# Patient Record
Sex: MALE | URBAN - METROPOLITAN AREA
[De-identification: names, ages, dates, MRNs, and addresses within clinical notes are randomized per-mention and may not be internally consistent; named-entity substitution may affect disease eponyms.]

---

## 2017-01-11 ENCOUNTER — TRANSFERRED RECORDS (OUTPATIENT)
Dept: HEALTH INFORMATION MANAGEMENT | Facility: CLINIC | Age: 37
End: 2017-01-11

## 2017-01-19 ENCOUNTER — TRANSFERRED RECORDS (OUTPATIENT)
Dept: HEALTH INFORMATION MANAGEMENT | Facility: CLINIC | Age: 37
End: 2017-01-19

## 2017-01-23 RX ORDER — HYDROCODONE BITARTRATE AND ACETAMINOPHEN 5; 325 MG/1; MG/1
2 TABLET ORAL EVERY 6 HOURS PRN
Status: ON HOLD | COMMUNITY
End: 2017-01-25

## 2017-01-24 RX ORDER — CEFAZOLIN SODIUM 1 G/3ML
1 INJECTION, POWDER, FOR SOLUTION INTRAMUSCULAR; INTRAVENOUS SEE ADMIN INSTRUCTIONS
Status: CANCELLED | OUTPATIENT
Start: 2017-01-24

## 2017-01-24 RX ORDER — CEFAZOLIN SODIUM 2 G/100ML
2 INJECTION, SOLUTION INTRAVENOUS
Status: CANCELLED | OUTPATIENT
Start: 2017-01-24

## 2017-01-25 ENCOUNTER — HOSPITAL ENCOUNTER (OUTPATIENT)
Facility: CLINIC | Age: 37
LOS: 1 days | Discharge: HOME | End: 2017-01-25
Attending: ORTHOPAEDIC SURGERY | Admitting: ORTHOPAEDIC SURGERY
Payer: COMMERCIAL

## 2017-01-25 ENCOUNTER — APPOINTMENT (OUTPATIENT)
Dept: GENERAL RADIOLOGY | Facility: CLINIC | Age: 37
End: 2017-01-25
Attending: ORTHOPAEDIC SURGERY
Payer: COMMERCIAL

## 2017-01-25 ENCOUNTER — ANESTHESIA (OUTPATIENT)
Dept: SURGERY | Facility: CLINIC | Age: 37
End: 2017-01-25
Payer: COMMERCIAL

## 2017-01-25 ENCOUNTER — ANESTHESIA EVENT (OUTPATIENT)
Dept: SURGERY | Facility: CLINIC | Age: 37
End: 2017-01-25
Payer: COMMERCIAL

## 2017-01-25 VITALS
HEIGHT: 72 IN | TEMPERATURE: 99 F | SYSTOLIC BLOOD PRESSURE: 129 MMHG | RESPIRATION RATE: 18 BRPM | DIASTOLIC BLOOD PRESSURE: 91 MMHG | BODY MASS INDEX: 31.97 KG/M2 | WEIGHT: 236 LBS | OXYGEN SATURATION: 95 %

## 2017-01-25 DIAGNOSIS — Z98.890 S/P DISCECTOMY: Primary | ICD-10-CM

## 2017-01-25 PROBLEM — M51.26 LUMBAR DISC HERNIATION: Status: ACTIVE | Noted: 2017-01-25

## 2017-01-25 PROCEDURE — 25000125 ZZHC RX 250: Performed by: NURSE ANESTHETIST, CERTIFIED REGISTERED

## 2017-01-25 PROCEDURE — 25000125 ZZHC RX 250: Performed by: PHYSICIAN ASSISTANT

## 2017-01-25 PROCEDURE — 27210794 ZZH OR GENERAL SUPPLY STERILE: Performed by: ORTHOPAEDIC SURGERY

## 2017-01-25 PROCEDURE — 25800025 ZZH RX 258: Performed by: SURGERY

## 2017-01-25 PROCEDURE — 25000566 ZZH SEVOFLURANE, EA 15 MIN: Performed by: ORTHOPAEDIC SURGERY

## 2017-01-25 PROCEDURE — 37000008 ZZH ANESTHESIA TECHNICAL FEE, 1ST 30 MIN: Performed by: ORTHOPAEDIC SURGERY

## 2017-01-25 PROCEDURE — 71000012 ZZH RECOVERY PHASE 1 LEVEL 1 FIRST HR: Performed by: ORTHOPAEDIC SURGERY

## 2017-01-25 PROCEDURE — 37000009 ZZH ANESTHESIA TECHNICAL FEE, EACH ADDTL 15 MIN: Performed by: ORTHOPAEDIC SURGERY

## 2017-01-25 PROCEDURE — 25000128 H RX IP 250 OP 636: Performed by: NURSE ANESTHETIST, CERTIFIED REGISTERED

## 2017-01-25 PROCEDURE — 25000125 ZZHC RX 250: Performed by: ORTHOPAEDIC SURGERY

## 2017-01-25 PROCEDURE — 40000277 XR SURGERY CARM FLUORO LESS THAN 5 MIN W STILLS: Mod: TC

## 2017-01-25 PROCEDURE — 25000125 ZZHC RX 250: Performed by: ANESTHESIOLOGY

## 2017-01-25 PROCEDURE — 36000065 ZZH SURGERY LEVEL 4 W FLUORO 1ST 30 MIN: Performed by: ORTHOPAEDIC SURGERY

## 2017-01-25 PROCEDURE — 25000132 ZZH RX MED GY IP 250 OP 250 PS 637: Performed by: PHYSICIAN ASSISTANT

## 2017-01-25 PROCEDURE — 36000063 ZZH SURGERY LEVEL 4 EA 15 ADDTL MIN: Performed by: ORTHOPAEDIC SURGERY

## 2017-01-25 PROCEDURE — 25000128 H RX IP 250 OP 636: Performed by: PHYSICIAN ASSISTANT

## 2017-01-25 PROCEDURE — 25000125 ZZHC RX 250: Performed by: SURGERY

## 2017-01-25 PROCEDURE — 40000170 ZZH STATISTIC PRE-PROCEDURE ASSESSMENT II: Performed by: ORTHOPAEDIC SURGERY

## 2017-01-25 RX ORDER — CEFAZOLIN SODIUM 1 G/3ML
1 INJECTION, POWDER, FOR SOLUTION INTRAMUSCULAR; INTRAVENOUS EVERY 8 HOURS
Status: DISCONTINUED | OUTPATIENT
Start: 2017-01-25 | End: 2017-01-25 | Stop reason: HOSPADM

## 2017-01-25 RX ORDER — ONDANSETRON 4 MG/1
4 TABLET, ORALLY DISINTEGRATING ORAL EVERY 30 MIN PRN
Status: DISCONTINUED | OUTPATIENT
Start: 2017-01-25 | End: 2017-01-25 | Stop reason: HOSPADM

## 2017-01-25 RX ORDER — ONDANSETRON 2 MG/ML
4 INJECTION INTRAMUSCULAR; INTRAVENOUS EVERY 6 HOURS PRN
Status: DISCONTINUED | OUTPATIENT
Start: 2017-01-25 | End: 2017-01-25 | Stop reason: HOSPADM

## 2017-01-25 RX ORDER — CEFAZOLIN SODIUM 1 G/3ML
1 INJECTION, POWDER, FOR SOLUTION INTRAMUSCULAR; INTRAVENOUS SEE ADMIN INSTRUCTIONS
Status: DISCONTINUED | OUTPATIENT
Start: 2017-01-25 | End: 2017-01-25 | Stop reason: HOSPADM

## 2017-01-25 RX ORDER — OXYCODONE HYDROCHLORIDE 5 MG/1
5-10 TABLET ORAL
Status: DISCONTINUED | OUTPATIENT
Start: 2017-01-25 | End: 2017-01-25 | Stop reason: HOSPADM

## 2017-01-25 RX ORDER — PROPOFOL 10 MG/ML
INJECTION, EMULSION INTRAVENOUS PRN
Status: DISCONTINUED | OUTPATIENT
Start: 2017-01-25 | End: 2017-01-25

## 2017-01-25 RX ORDER — CEFAZOLIN SODIUM 2 G/100ML
2 INJECTION, SOLUTION INTRAVENOUS
Status: COMPLETED | OUTPATIENT
Start: 2017-01-25 | End: 2017-01-25

## 2017-01-25 RX ORDER — DIPHENHYDRAMINE HYDROCHLORIDE 50 MG/ML
INJECTION INTRAMUSCULAR; INTRAVENOUS PRN
Status: DISCONTINUED | OUTPATIENT
Start: 2017-01-25 | End: 2017-01-25

## 2017-01-25 RX ORDER — SODIUM CHLORIDE, SODIUM LACTATE, POTASSIUM CHLORIDE, CALCIUM CHLORIDE 600; 310; 30; 20 MG/100ML; MG/100ML; MG/100ML; MG/100ML
1000 INJECTION, SOLUTION INTRAVENOUS CONTINUOUS
Status: DISCONTINUED | OUTPATIENT
Start: 2017-01-25 | End: 2017-01-25 | Stop reason: HOSPADM

## 2017-01-25 RX ORDER — ALBUTEROL SULFATE 0.83 MG/ML
2.5 SOLUTION RESPIRATORY (INHALATION) EVERY 4 HOURS PRN
Status: DISCONTINUED | OUTPATIENT
Start: 2017-01-25 | End: 2017-01-25 | Stop reason: HOSPADM

## 2017-01-25 RX ORDER — HYDROMORPHONE HYDROCHLORIDE 1 MG/ML
.3-.5 INJECTION, SOLUTION INTRAMUSCULAR; INTRAVENOUS; SUBCUTANEOUS EVERY 5 MIN PRN
Status: DISCONTINUED | OUTPATIENT
Start: 2017-01-25 | End: 2017-01-25 | Stop reason: HOSPADM

## 2017-01-25 RX ORDER — FENTANYL CITRATE 50 UG/ML
25-50 INJECTION, SOLUTION INTRAMUSCULAR; INTRAVENOUS
Status: DISCONTINUED | OUTPATIENT
Start: 2017-01-25 | End: 2017-01-25 | Stop reason: HOSPADM

## 2017-01-25 RX ORDER — PROCHLORPERAZINE MALEATE 5 MG
5-10 TABLET ORAL EVERY 6 HOURS PRN
Status: DISCONTINUED | OUTPATIENT
Start: 2017-01-25 | End: 2017-01-25 | Stop reason: HOSPADM

## 2017-01-25 RX ORDER — ONDANSETRON 4 MG/1
4 TABLET, ORALLY DISINTEGRATING ORAL EVERY 6 HOURS PRN
Status: DISCONTINUED | OUTPATIENT
Start: 2017-01-25 | End: 2017-01-25 | Stop reason: HOSPADM

## 2017-01-25 RX ORDER — DEXAMETHASONE SODIUM PHOSPHATE 4 MG/ML
INJECTION, SOLUTION INTRA-ARTICULAR; INTRALESIONAL; INTRAMUSCULAR; INTRAVENOUS; SOFT TISSUE PRN
Status: DISCONTINUED | OUTPATIENT
Start: 2017-01-25 | End: 2017-01-25

## 2017-01-25 RX ORDER — BETAMETHASONE SODIUM PHOSPHATE AND BETAMETHASONE ACETATE 3; 3 MG/ML; MG/ML
INJECTION, SUSPENSION INTRA-ARTICULAR; INTRALESIONAL; INTRAMUSCULAR; SOFT TISSUE PRN
Status: DISCONTINUED | OUTPATIENT
Start: 2017-01-25 | End: 2017-01-25 | Stop reason: HOSPADM

## 2017-01-25 RX ORDER — ESMOLOL HYDROCHLORIDE 10 MG/ML
INJECTION INTRAVENOUS PRN
Status: DISCONTINUED | OUTPATIENT
Start: 2017-01-25 | End: 2017-01-25

## 2017-01-25 RX ORDER — SODIUM CHLORIDE 9 MG/ML
1000 INJECTION, SOLUTION INTRAVENOUS CONTINUOUS
Status: DISCONTINUED | OUTPATIENT
Start: 2017-01-25 | End: 2017-01-25 | Stop reason: HOSPADM

## 2017-01-25 RX ORDER — ONDANSETRON 2 MG/ML
4 INJECTION INTRAMUSCULAR; INTRAVENOUS EVERY 30 MIN PRN
Status: DISCONTINUED | OUTPATIENT
Start: 2017-01-25 | End: 2017-01-25 | Stop reason: HOSPADM

## 2017-01-25 RX ORDER — NEOSTIGMINE METHYLSULFATE 1 MG/ML
VIAL (ML) INJECTION PRN
Status: DISCONTINUED | OUTPATIENT
Start: 2017-01-25 | End: 2017-01-25

## 2017-01-25 RX ORDER — SODIUM CHLORIDE, SODIUM LACTATE, POTASSIUM CHLORIDE, CALCIUM CHLORIDE 600; 310; 30; 20 MG/100ML; MG/100ML; MG/100ML; MG/100ML
INJECTION, SOLUTION INTRAVENOUS CONTINUOUS
Status: DISCONTINUED | OUTPATIENT
Start: 2017-01-25 | End: 2017-01-25 | Stop reason: HOSPADM

## 2017-01-25 RX ORDER — LIDOCAINE HYDROCHLORIDE 20 MG/ML
INJECTION, SOLUTION INFILTRATION; PERINEURAL PRN
Status: DISCONTINUED | OUTPATIENT
Start: 2017-01-25 | End: 2017-01-25

## 2017-01-25 RX ORDER — OXYCODONE HYDROCHLORIDE 5 MG/1
5-10 TABLET ORAL
Qty: 30 TABLET | Refills: 0 | Status: SHIPPED | OUTPATIENT
Start: 2017-01-25

## 2017-01-25 RX ORDER — NALOXONE HYDROCHLORIDE 0.4 MG/ML
.1-.4 INJECTION, SOLUTION INTRAMUSCULAR; INTRAVENOUS; SUBCUTANEOUS
Status: DISCONTINUED | OUTPATIENT
Start: 2017-01-25 | End: 2017-01-25 | Stop reason: HOSPADM

## 2017-01-25 RX ORDER — CEFAZOLIN SODIUM 2 G/100ML
2 INJECTION, SOLUTION INTRAVENOUS
Status: DISCONTINUED | OUTPATIENT
Start: 2017-01-25 | End: 2017-01-25 | Stop reason: HOSPADM

## 2017-01-25 RX ORDER — HYDROMORPHONE HYDROCHLORIDE 1 MG/ML
.3-.5 INJECTION, SOLUTION INTRAMUSCULAR; INTRAVENOUS; SUBCUTANEOUS EVERY 30 MIN PRN
Status: DISCONTINUED | OUTPATIENT
Start: 2017-01-25 | End: 2017-01-25 | Stop reason: HOSPADM

## 2017-01-25 RX ORDER — GLYCOPYRROLATE 0.2 MG/ML
INJECTION, SOLUTION INTRAMUSCULAR; INTRAVENOUS PRN
Status: DISCONTINUED | OUTPATIENT
Start: 2017-01-25 | End: 2017-01-25

## 2017-01-25 RX ORDER — ONDANSETRON 2 MG/ML
INJECTION INTRAMUSCULAR; INTRAVENOUS PRN
Status: DISCONTINUED | OUTPATIENT
Start: 2017-01-25 | End: 2017-01-25

## 2017-01-25 RX ORDER — MEPERIDINE HYDROCHLORIDE 25 MG/ML
12.5 INJECTION INTRAMUSCULAR; INTRAVENOUS; SUBCUTANEOUS EVERY 5 MIN PRN
Status: DISCONTINUED | OUTPATIENT
Start: 2017-01-25 | End: 2017-01-25 | Stop reason: HOSPADM

## 2017-01-25 RX ORDER — FENTANYL CITRATE 50 UG/ML
INJECTION, SOLUTION INTRAMUSCULAR; INTRAVENOUS PRN
Status: DISCONTINUED | OUTPATIENT
Start: 2017-01-25 | End: 2017-01-25

## 2017-01-25 RX ADMIN — SODIUM CHLORIDE, POTASSIUM CHLORIDE, SODIUM LACTATE AND CALCIUM CHLORIDE: 600; 310; 30; 20 INJECTION, SOLUTION INTRAVENOUS at 13:18

## 2017-01-25 RX ADMIN — OXYCODONE HYDROCHLORIDE 5 MG: 5 TABLET ORAL at 19:46

## 2017-01-25 RX ADMIN — LIDOCAINE HYDROCHLORIDE 0.5 ML: 10 INJECTION, SOLUTION INFILTRATION; PERINEURAL at 10:45

## 2017-01-25 RX ADMIN — PROPOFOL 50 MG: 10 INJECTION, EMULSION INTRAVENOUS at 12:53

## 2017-01-25 RX ADMIN — ONDANSETRON 4 MG: 2 INJECTION INTRAMUSCULAR; INTRAVENOUS at 13:43

## 2017-01-25 RX ADMIN — ROCURONIUM BROMIDE 50 MG: 10 INJECTION INTRAVENOUS at 12:20

## 2017-01-25 RX ADMIN — HYDROMORPHONE HYDROCHLORIDE 0.5 MG: 1 INJECTION, SOLUTION INTRAMUSCULAR; INTRAVENOUS; SUBCUTANEOUS at 14:39

## 2017-01-25 RX ADMIN — LIDOCAINE HYDROCHLORIDE 40 MG: 20 INJECTION, SOLUTION INFILTRATION; PERINEURAL at 12:27

## 2017-01-25 RX ADMIN — PHENYLEPHRINE HYDROCHLORIDE 100 MCG: 10 INJECTION, SOLUTION INTRAMUSCULAR; INTRAVENOUS; SUBCUTANEOUS at 13:05

## 2017-01-25 RX ADMIN — FENTANYL CITRATE 50 MCG: 50 INJECTION, SOLUTION INTRAMUSCULAR; INTRAVENOUS at 14:51

## 2017-01-25 RX ADMIN — ROCURONIUM BROMIDE 10 MG: 10 INJECTION INTRAVENOUS at 13:08

## 2017-01-25 RX ADMIN — SODIUM CHLORIDE, POTASSIUM CHLORIDE, SODIUM LACTATE AND CALCIUM CHLORIDE: 600; 310; 30; 20 INJECTION, SOLUTION INTRAVENOUS at 10:45

## 2017-01-25 RX ADMIN — ROCURONIUM BROMIDE 10 MG: 10 INJECTION INTRAVENOUS at 13:21

## 2017-01-25 RX ADMIN — CEFAZOLIN SODIUM 2 G: 2 INJECTION, SOLUTION INTRAVENOUS at 12:50

## 2017-01-25 RX ADMIN — NEOSTIGMINE METHYLSULFATE 5 MG: 1 INJECTION INTRAMUSCULAR; INTRAVENOUS; SUBCUTANEOUS at 13:47

## 2017-01-25 RX ADMIN — PHENYLEPHRINE HYDROCHLORIDE 100 MCG: 10 INJECTION, SOLUTION INTRAMUSCULAR; INTRAVENOUS; SUBCUTANEOUS at 13:11

## 2017-01-25 RX ADMIN — GLYCOPYRROLATE 1 MG: 0.2 INJECTION, SOLUTION INTRAMUSCULAR; INTRAVENOUS at 13:47

## 2017-01-25 RX ADMIN — PHENYLEPHRINE HYDROCHLORIDE 100 MCG: 10 INJECTION, SOLUTION INTRAMUSCULAR; INTRAVENOUS; SUBCUTANEOUS at 13:39

## 2017-01-25 RX ADMIN — FENTANYL CITRATE 100 MCG: 50 INJECTION, SOLUTION INTRAMUSCULAR; INTRAVENOUS at 12:25

## 2017-01-25 RX ADMIN — FENTANYL CITRATE 50 MCG: 50 INJECTION, SOLUTION INTRAMUSCULAR; INTRAVENOUS at 13:26

## 2017-01-25 RX ADMIN — MIDAZOLAM HYDROCHLORIDE 2 MG: 1 INJECTION, SOLUTION INTRAMUSCULAR; INTRAVENOUS at 12:20

## 2017-01-25 RX ADMIN — PHENYLEPHRINE HYDROCHLORIDE 100 MCG: 10 INJECTION, SOLUTION INTRAMUSCULAR; INTRAVENOUS; SUBCUTANEOUS at 13:01

## 2017-01-25 RX ADMIN — DIPHENHYDRAMINE HYDROCHLORIDE 12.5 MG: 50 INJECTION, SOLUTION INTRAMUSCULAR; INTRAVENOUS at 12:44

## 2017-01-25 RX ADMIN — ESMOLOL HYDROCHLORIDE 10 MG: 10 INJECTION, SOLUTION INTRAVENOUS at 12:56

## 2017-01-25 RX ADMIN — DEXMEDETOMIDINE 12 MCG: 100 INJECTION, SOLUTION, CONCENTRATE INTRAVENOUS at 12:44

## 2017-01-25 RX ADMIN — FENTANYL CITRATE 50 MCG: 50 INJECTION, SOLUTION INTRAMUSCULAR; INTRAVENOUS at 14:03

## 2017-01-25 RX ADMIN — PHENYLEPHRINE HYDROCHLORIDE 100 MCG: 10 INJECTION, SOLUTION INTRAMUSCULAR; INTRAVENOUS; SUBCUTANEOUS at 13:00

## 2017-01-25 RX ADMIN — DEXAMETHASONE SODIUM PHOSPHATE 4 MG: 4 INJECTION, SOLUTION INTRAMUSCULAR; INTRAVENOUS at 12:44

## 2017-01-25 RX ADMIN — OXYCODONE HYDROCHLORIDE 5 MG: 5 TABLET ORAL at 18:04

## 2017-01-25 RX ADMIN — FENTANYL CITRATE 50 MCG: 50 INJECTION, SOLUTION INTRAMUSCULAR; INTRAVENOUS at 13:50

## 2017-01-25 RX ADMIN — FENTANYL CITRATE 50 MCG: 50 INJECTION, SOLUTION INTRAMUSCULAR; INTRAVENOUS at 14:39

## 2017-01-25 RX ADMIN — PROPOFOL 200 MG: 10 INJECTION, EMULSION INTRAVENOUS at 12:20

## 2017-01-25 RX ADMIN — PROPOFOL 20 MG: 10 INJECTION, EMULSION INTRAVENOUS at 13:26

## 2017-01-25 RX ADMIN — HYDROMORPHONE HYDROCHLORIDE 0.3 MG: 1 INJECTION, SOLUTION INTRAMUSCULAR; INTRAVENOUS; SUBCUTANEOUS at 16:35

## 2017-01-25 RX ADMIN — FENTANYL CITRATE 50 MCG: 50 INJECTION, SOLUTION INTRAMUSCULAR; INTRAVENOUS at 12:54

## 2017-01-25 RX ADMIN — SODIUM CHLORIDE 1000 ML: 9 INJECTION, SOLUTION INTRAVENOUS at 15:35

## 2017-01-25 ASSESSMENT — LIFESTYLE VARIABLES: TOBACCO_USE: 0

## 2017-01-25 NOTE — ANESTHESIA CARE TRANSFER NOTE
Patient: Tez Turner    DECOMPRESSION LUMBAR ONE LEVEL (Left Spine)  LAMINECTOMY LUMBAR ONE LEVEL (Left Spine)  Additional InformationProcedure(s):  LEFT L5-S1 DECOMPRESSION AND DISCECTOMY  - Wound Class: I-Clean   - Wound Class: I-Clean    Diagnosis: left l5-s1 disc hernation with left s1 radiculopathy, status post right l4-5 and l5-s1 discectomy   Diagnosis Additional Information: No value filed.    Anesthesia Type:   General, ETT     Note:  Airway :Face Mask  Patient transferred to:PACU  Comments: Transferred to PACU, spontaneous respirations, 10L oxygen via facemask.  All monitors and alarms on and functioning, VSS.  Patient awake, comfortable.  Report to PACU RN.      Vitals: (Last set prior to Anesthesia Care Transfer)              Electronically Signed By: GAVIN Gotti CRNA  January 25, 2017  2:10 PM

## 2017-01-25 NOTE — IP AVS SNAPSHOT
MRN:[id number]                      After Visit Summary   1/25/2017    Tez Turner    MRN: [id number]           Thank you!     Thank you for choosing [facility] for your care. Our goal is always to provide you with excellent care. Hearing back from our patients is one way we can continue to improve our services. Please take a few minutes to complete the written survey that you may receive in the mail after you visit with us. Thank you!        Patient Information     Date Of Birth          1980        About your hospital stay     You were admitted on:  January 25, 2017 You last received care in the:  [facility] [address] [facility]    You were discharged on:  January 25, 2017       Who to Call     For medical emergencies, please call 911.  For non-urgent questions about your medical care, please call your primary care provider or clinic, None  For questions related to your surgery, please call your surgery clinic        Attending Provider     Provider    Parker Goldstein MD       Primary Care Provider    None       No address on file        After Care Instructions     Activity       Your activity upon discharge: Give patient discharge instructions see and dressing supplies.            discharge istructions       No lifting over 5 lbs and follow Dr. Goldstein d/eloy instruction sheet  Give pt copy of Dr. Triston farrar instruction sheet                  Follow-up Appointments     Follow-up and recommended labs and tests        Patient has a follow-up appointment to see me in 2-3 weeks.                  Further instructions from your care team                 Care after a Discectomy/Decompression - Dr. Parker Goldstein    The following information will help you through your recovery at home.  Pain    It is normal for you to experience some pain in the area of your incision after your surgery.  Some of your leg pain may go away immediately after your surgery.  Some of the pain will gradually decrease  over the next few days or weeks depending on the amount of inflammation present in the nerve.      Sometimes Dr. Goldstein will place a steroid preparation on the nerve during surgery.   This may help decrease the nerve inflammation for the first few days after surgery.  If some of your leg pain returns 3 to 5 days after surgery it may be due to the steroid wearing off.  The pain should not be as bad as your pre-op pain and will diminish over a period of weeks.      You should call Dr. Goldstein s office if your leg pain returns suddenly and does not improve over 24 hours.    Activity    You may increase your activity as tolerated; walking is the best form of exercise after spine surgery.      Avoid bending, lifting, and twisting (BLT).  Avoid activities such as vacuuming, raking and shoveling.    Try to limit your lifting to 10-15lbs during the first 2 weeks and then increase to 20-25lbs over the next 6 weeks.    You may return to work approximately 1- 2 weeks after your surgery, if you have a sedentary or desk type job.  If you have a physical job Dr. Goldstein and his staff will help you determine a return to work plan.      You may resume sexual activity when you feel ready.  Stop if you have pain.    Driving    You may drive if you feel strong enough and are not taking any narcotic pain medications.    Incision Site     The first 3 days after surgery Dr. Goldstein would like you to keep your incision dry.   You may take a sponge bath during this time or cover your dressing with a transparent material called Tegaderm (sold at most pharmacies).      The first 3 days after surgery you should change your gauze dressing at least once a day.  After 3 days you may remove the gauze dressing and leave it off, at this point you may shower without covering your incision, allow water and soap to run over your incision but do not scrub the area or soak in a tub.    Your incision is covered with steri-strips (narrow white tapes); they will  get loose and fall of in 7-10 days.  If they do not fall off after 10 days you may remove them or Dr. Triston mcdonough staff will remove them at your post-op visit.    Most patients have dissolvable stitches which do not need to be removed.  In some cases nylon stitches are used and they need to be removed, 10-14 days after surgery.  If you have staples or nylon sutures please call for a removal appointment with Dr. Triston mcdonough nurse.    Pain Management     Take your prescribed pain medication as needed and directed.  Use Tylenol and Ibuprofen for your discomfort when you no longer need the narcotic pain medication.      If you need a refill on your pain medication call [phone], please allow 24 hours for your prescription to be refilled, Dr. Triston mcdonough office does not refill pain medications on Friday.   Diet     Eat a healthy diet; this will help your recovery.    Drink plenty of fluids, water, milk or juice.    If you have trouble with constipation you should eat more fiber, drink more fluids, increase your walking or try an over the counter laxative.    Follow-up Visits    You should have a post-op appointment that was scheduled for you at the same time your surgery was scheduled. If you do not, please call Dr. Triston mcdonough office when you get home from your surgery.    Write down any questions you have about your surgery, recovery, return to work and other topics you wished to be covered at your post-op visit.  This way, we will be able to address all of your questions at your next visit.    Call Dr. Triston mcdonough office if you have any questions or concerns.    When to Call your Doctor:    If you have any redness, warmth or swelling at the incision site.    If your incision opens up.    If you have increasing drainage from your incision.    If you have a temperature greater than 100.5 degrees Fahrenheit.    [facility] [place] [address]  Phone: [phone]  Fax: [phone]  Web site: [email or web address]    Pending Results  "    No orders found from 2017 to 2017.            Statement of Approval     Ordered          17 1644  I have reviewed and agree with all the recommendations and orders detailed in this document.   EFFECTIVE NOW     Approved and electronically signed by:  Parker Goldstein MD             Admission Information        Provider Department Dept Phone    2017 Parker Goldstein MD  [facility] [phone]      Your Vitals Were     Blood Pressure Temperature Respirations    129/91 mmHg 99  F (37.2  C) (Oral) 18    Height Weight BMI (Body Mass Index)    1.829 m (6') 107.049 kg (236 lb) 32.00 kg/m2    Pulse Oximetry          95%        MyChart Information     [employer] lets you send messages to your doctor, view your test results, renew your prescriptions, schedule appointments and more. To sign up, go to [email or web address] . Click on \"Log in\" on the left side of the screen, which will take you to the Welcome page. Then click on \"Sign up Now\" on the right side of the page.     You will be asked to enter the access code listed below, as well as some personal information. Please follow the directions to create your username and password.     Your access code is: [email or web address]  Expires: 2017  3:39 PM     Your access code will  in 90 days. If you need help or a new code, please call your [facility] clinic or [phone].        Care EveryWhere ID     This is your Care EveryWhere ID. This could be used by other organizations to access your [facility] medical records  [id number]           Review of your medicines      CONTINUE these medicines which may have CHANGED, or have new prescriptions. If we are uncertain of the size of tablets/capsules you have at home, strength may be listed as something that might have changed.        Dose / Directions    oxyCODONE 5 MG IR tablet   Commonly known as:  ROXICODONE   This may have changed:    - medication strength  - how much to take  - when to " take this  - reasons to take this   Used for:  S/P discectomy        Dose:  5-10 mg   Take 1-2 tablets (5-10 mg) by mouth every 3 hours as needed for moderate to severe pain   Quantity:  30 tablet   Refills:  0         CONTINUE these medicines which have NOT CHANGED        Dose / Directions    ADVIL PO        Dose:  800 mg   Take 800 mg by mouth 4 times daily as needed for moderate pain   Refills:  0       CLARITIN-D 12 HOUR PO        Dose:  1 tablet   Take 1 tablet by mouth 2 times daily   Refills:  0       TYLENOL PO   Notes to Patient:  Do not exceed 4,000 mg of Tylenol within a 24 hour period.        Dose:  1000 mg   Take 1,000 mg by mouth every 6 hours as needed   Refills:  0         STOP taking     HYDROcodone-acetaminophen 5-325 MG per tablet   Commonly known as:  NORCO                Where to get your medicines      Some of these will need a paper prescription and others can be bought over the counter. Ask your nurse if you have questions.     Bring a paper prescription for each of these medications    - oxyCODONE 5 MG IR tablet             Protect others around you: Learn how to safely use, store and throw away your medicines at www.disposemymeds.org.             Medication List: This is a list of all your medications and when to take them. Check marks below indicate your daily home schedule. Keep this list as a reference.      Medications           Morning Afternoon Evening Bedtime As Needed    ADVIL PO   Take 800 mg by mouth 4 times daily as needed for moderate pain   Next Dose Due:  Anytime today as needed for pain.                            As needed anytime today for pain.       CLARITIN-D 12 HOUR PO   Take 1 tablet by mouth 2 times daily   Next Dose Due:  This evening.                                      oxyCODONE 5 MG IR tablet   Commonly known as:  ROXICODONE   Take 1-2 tablets (5-10 mg) by mouth every 3 hours as needed for moderate to severe pain   Last time this was given:  5 mg on 1/25/2017   7:46 PM   Next Dose Due:  Anytime after 10:00 pm tonight as needed for pain.                            Anytime after 10:00 pm tonight as needed for pain.         TYLENOL PO   Take 1,000 mg by mouth every 6 hours as needed   Next Dose Due:  Anytime today as needed for pain.   Notes to Patient:  Do not exceed 4,000 mg of Tylenol within a 24 hour period.                            Anytime today as needed for pain.

## 2017-01-25 NOTE — BRIEF OP NOTE
[facility]  Spinal Surgery Brief Operative Note    Pre-operative diagnosis: left l5-s1 disc hernation with left s1 radiculopathy, status post right l4-5 and l5-s1 discectomy    Post-operative diagnosis: Same   Procedure: LEFT L5-S1 DISCECTOMY   Surgeon: JENNIFER CRAIG MD   Assistant(s): RADHA CORONEL PA-C   Anesthesia: General endotracheal anesthesia   Estimated blood loss: 10 ml   Total IV fluids: (See anesthesia record)   Blood transfusion: NONE   Drains: NONE   Specimens: NONE   Implants: AS ABOVE   Findings: AS ABOVE   Complications: NONE   Condition: STABLE   Comments: SEE DICTATED OPERATIVE REPORT FOR DETAILS

## 2017-01-25 NOTE — PROGRESS NOTES
Admission medication history interview status for the 1/25/2017  admission is complete. See EPIC admission navigator for prior to admission medications     Medication history source reliability:Good    Medication history interview source(s):Patient    Medication history resources (including written lists, pill bottles, clinic record):Patient mailed in med list prior to the day of surgery.    [facility]    Additional medication history information not noted on PTA med list :None    Time spent in this activity: 30 minutes    Prior to Admission medications    Medication Sig Last Dose Taking? Auth Provider   Ibuprofen (ADVIL PO) Take 800 mg by mouth 4 times daily as needed for moderate pain Over 2 weeks ago at prn Yes Reported, Patient   Loratadine-Pseudoephedrine (CLARITIN-D 12 HOUR PO) Take 1 tablet by mouth 2 times daily Over 3 months ago at prn Yes Reported, Patient   HYDROcodone-acetaminophen (NORCO) 5-325 MG per tablet Take 2 tablets by mouth every 6 hours as needed for moderate to severe pain  1/24/2017 at 2200 Yes Reported, Patient   OXYCODONE HCL PO Take 10 mg by mouth every 4 hours as needed  1/19/2017 at prn Yes Reported, Patient   Acetaminophen (TYLENOL PO) Take 1,000 mg by mouth every 6 hours as needed  1/24/2017 at 2200 Yes Reported, Patient

## 2017-01-25 NOTE — ANESTHESIA POSTPROCEDURE EVALUATION
Patient: Tez Turner    DECOMPRESSION LUMBAR ONE LEVEL (Left Spine)  LAMINECTOMY LUMBAR ONE LEVEL (Left Spine)  Additional InformationProcedure(s):  LEFT L5-S1 DECOMPRESSION AND DISCECTOMY  - Wound Class: I-Clean   - Wound Class: I-Clean    Diagnosis:left l5-s1 disc hernation with left s1 radiculopathy, status post right l4-5 and l5-s1 discectomy   Diagnosis Additional Information: No value filed.    Anesthesia Type:  General, ETT    Note:  Anesthesia Post Evaluation    Patient location during evaluation: PACU  Patient participation: Able to fully participate in evaluation  Level of consciousness: awake  Pain management: adequate  Airway patency: patent  Cardiovascular status: acceptable  Respiratory status: acceptable  Hydration status: acceptable  PONV: none     Anesthetic complications: None          Last vitals:  Filed Vitals:    01/25/17 1400 01/25/17 1410 01/25/17 1415   BP: 135/82 129/83 121/78   Temp: 36.7  C (98  F)     Resp: 20 11 20   SpO2: 96% 98% 98%       Electronically Signed By: Nicolette Aguilar MD  January 25, 2017  2:52 PM

## 2017-01-25 NOTE — ANESTHESIA PREPROCEDURE EVALUATION
Procedure: Procedure(s):  DECOMPRESSION LUMBAR ONE LEVEL  LAMINECTOMY LUMBAR ONE LEVEL  Preop diagnosis: left l5-s1 disc hernation with left s1 radiculopathy, status post right l4-5 and l5-s1 discectomy     No Known Allergies  Past Medical History   Diagnosis Date     Chronic pain      Other chronic pain      Numbness and tingling      tingling localized to LLE     Past Surgical History   Procedure Laterality Date     Back surgery       Prior to Admission medications    Medication Sig Start Date End Date Taking? Authorizing Provider   Ibuprofen (ADVIL PO) Take 800 mg by mouth 4 times daily as needed for moderate pain   Yes Reported, Patient   Loratadine-Pseudoephedrine (CLARITIN-D 12 HOUR PO) Take 1 tablet by mouth 2 times daily   Yes Reported, Patient   HYDROcodone-acetaminophen (NORCO) 5-325 MG per tablet Take 2 tablets by mouth every 6 hours as needed for moderate to severe pain    Yes Reported, Patient   OXYCODONE HCL PO Take 10 mg by mouth every 4 hours as needed    Yes Reported, Patient   Acetaminophen (TYLENOL PO) Take 1,000 mg by mouth every 6 hours as needed    Yes Reported, Patient     Current Facility-Administered Medications Ordered in Epic   Medication Dose Route Frequency Last Rate Last Dose     lidocaine 1 % 1 mL  1 mL Other Q1H PRN   0.5 mL at 01/25/17 1045     lactated ringers infusion   Intravenous Continuous 25 mL/hr at 01/25/17 1045       ceFAZolin sodium-dextrose (ANCEF) infusion 2 g  2 g Intravenous Pre-Op/Pre-procedure x 1 dose         ceFAZolin (ANCEF) 1 g vial to attach to  ml bag for ADULT or 50 ml bag for PEDS  1 g Intravenous See Admin Instructions         ceFAZolin sodium-dextrose (ANCEF) infusion 2 g  2 g Intravenous Pre-Op/Pre-procedure x 1 dose         ceFAZolin (ANCEF) 1 g vial to attach to  ml bag for ADULT or 50 ml bag for PEDS  1 g Intravenous See Admin Instructions         ceFAZolin (ANCEF) 1 g vial to attach to  ml bag for ADULT or 50 ml bag for PEDS  1 g  Intravenous See Admin Instructions         No current Epic-ordered outpatient prescriptions on file.     Wt Readings from Last 1 Encounters:   01/25/17 107.049 kg (236 lb)     Temp Readings from Last 1 Encounters:   01/25/17 37.1  C (98.8  F) Temporal     BP Readings from Last 6 Encounters:   01/25/17 125/92     Pulse Readings from Last 4 Encounters:   No data found for Pulse     Resp Readings from Last 1 Encounters:   01/25/17 18     SpO2 Readings from Last 1 Encounters:   01/25/17 97%     No results for input(s): NA, POTASSIUM, CHLORIDE, CO2, ANIONGAP, GLC, BUN, CR, PAULINA in the last 12029 hours.  No results for input(s): WBC, HGB, PLT in the last 62277 hours.  No results for input(s): INR in the last 69912 hours.    Invalid input(s): APTT   RECENT LABS:   ECG:   ECHO:   CXR:      Anesthesia Evaluation     .        ROS/MED HX    ENT/Pulmonary:      (-) tobacco use and sleep apnea   Neurologic:     (+)neuropathy - DDD, left leg wekness and numbness/tingling,     Cardiovascular:         METS/Exercise Tolerance:  >4 METS   Hematologic:         Musculoskeletal:         GI/Hepatic:        (-) GERD   Renal/Genitourinary:         Endo:         Psychiatric:         Infectious Disease:         Malignancy:         Other:    (+) H/O Chronic Pain,             Physical Exam  Normal systems: cardiovascular, pulmonary and dental    Airway   Mallampati: III  TM distance: >3 FB  Neck ROM: full    Dental     Cardiovascular       Pulmonary     Other findings: Small mouth opening                Anesthesia Plan      History & Physical Review  History and physical reviewed and following examination; no interval change.    ASA Status:  1 .    NPO Status:  > 8 hours    Plan for General and ETT with Intravenous and Propofol induction. Maintenance will be Balanced.    PONV prophylaxis:  Ondansetron (or other 5HT-3)  Additional equipment: Videolaryngoscope      Postoperative Care  Postoperative pain management:  IV analgesics and Oral pain  medications.      Consents  Anesthetic plan, risks, benefits and alternatives discussed with:  Patient..                          .

## 2017-01-25 NOTE — IP AVS SNAPSHOT
[facility]    [address]    [place] [address]    Phone:  [phone]                                       After Visit Summary   1/25/2017    Tez Turner    MRN: [id number]           After Visit Summary Signature Page     I have received my discharge instructions, and my questions have been answered. I have discussed any challenges I see with this plan with the nurse or doctor.    ..........................................................................................................................................  Patient/Patient Representative Signature      ..........................................................................................................................................  Patient Representative Print Name and Relationship to Patient    ..................................................               ................................................  Date                                            Time    ..........................................................................................................................................  Reviewed by Signature/Title    ...................................................              ..............................................  Date                                                            Time

## 2017-01-26 NOTE — OP NOTE
DATE OF SURGERY:  01/25/2017      PREOPERATIVE DIAGNOSIS: Left L5-S1 disc herniation with left S1 radiculopathy and lateral recess stenosis.  Status post previous right L4-5 and L5-S1 diskectomies.     POSTOPERATIVE DIAGNOSIS: SAME    PROCEDURE: Left L5-S1 diskectomy     SURGEON:  Parker Goldstein MD      ASSISTANT:  Rosalinda Christian PA-C      ANESTHESIA:  General.      OPERATIVE DESCRIPTION:  Tez Turner was brought to the operating room.  A general anesthetic was administered by the Anesthesiology staff.  The patient was positioned prone on the Mcdermott frame.  He was carefully padded and positioned and his back was prepped and draped in routine sterile fashion.      A marking needle was placed over what was felt to be the L5-S1 interspace.  A lateral x-ray was obtained.  This confirmed that it was at the L5-S1 level.  The needle was removed.  The planned skin incision was injected with 10 cc of 0.5% Marcaine with epinephrine.  This was right in the middle of his previous incision for the right-sided diskectomy that he had.        After the timeout, a midline skin incision was made using the previous skin incision.  Sharp dissection was carried down through the skin.  Electrocautery was used to develop the incision down to the fascia.  The fascia was incised.  It was difficult to feel the spinous processes.  He is 6 feet tall, 230 pounds.  It was a very deep incision.  We had to use our longest retractor blades.  A left-sided exposure at the L5-S1 level was performed.  Hudson retractor was placed and a Penfield was placed over the ligamentum flavum.  Another lateral x-ray was obtained.  This showed that we were at the L5-S1 level and the ligamentum and Penfield was over the appropriate site.  The ligamentum flavum was then removed between L5 and S1.  He had a very vertical lamina of S1 and L5.  Ligamentum flavum was thinned out and then removed.  The S1 nerve root was trapped in the lateral recess under the  superior articular process of S1.  After removing the ligamentum flavum, I had to use a 1 mm Kerrison to remove the bone that was overlying the nerve root.  Then down at the pedicle level I was able to slip a nerve hook underneath the nerve root establishing the location of the undersurface.  The undersurface of the nerve was stuck to the underlying disc material.  I was then sequentially able to gently tease the nerve off of the disc.  There was disc material that extruded from underneath the nerve as I moved it over.  This was some free fragments of disc that were actually outside of the annulus and just above the edge of the inferior articular endplate of L5.  This was grabbed with pituitary and removed.  As I moved the nerve towards the midline of the disc more disc material extruded from underneath the nerve and there was an obvious hole in the annulus where disc material was extruding from.  This was grabbed with a pituitary.  It was very adherent and stuck to the rest of the endplate and annulus and had to be removed piecemeal.  When I eventually freed it up, I was able to use straighten upbiting Zuniga pituitaries to reach into the disc and take out additional loose material.  When I could not retrieve any more disc material, my assistant released the nerve.  The nerve laid in its normal position now without any pressure on it from below.  The nerve was anesthetized with 0.1 cc of 1% lidocaine preservative-free.  Additional Marcaine was used in the skin, fascia and muscle.  The disc was checked one more time and I could see the pedicle of S1 clearly.  After final irrigation, Celestone and Gelfoam were placed over the nerve.  Hemostasis was checked with bipolar cautery and the wound was closed in a routine fashion with interrupted #1 Vicryl suture for the deep fascia, 2-0 for the subcutaneous and 3-0 for the skin.  Dressings were applied, drapes removed.  He was transferred back to the hospital cart and taken  to the recovery room in good condition.      ESTIMATED BLOOD LOSS:  10 cc.      COMPLICATIONS:  None.      DRAINS:  None.         JENNIFER CRAIG MD             D: 2017 13:59   T: 2017 22:16   MT: [id number]      Name:     MUNIR SWIFT   MRN:      [id number]        Account:        [id number]   :      1980           Procedure Date: 2017      Document: [id number]       cc: Copy for Provider        Jennifer Craig MD       [facility] [place]

## 2017-01-26 NOTE — DISCHARGE INSTRUCTIONS
Care after a Discectomy/Decompression - Dr. Parker Goldstein    The following information will help you through your recovery at home.  Pain    It is normal for you to experience some pain in the area of your incision after your surgery.  Some of your leg pain may go away immediately after your surgery.  Some of the pain will gradually decrease over the next few days or weeks depending on the amount of inflammation present in the nerve.      Sometimes Dr. Goldstein will place a steroid preparation on the nerve during surgery.   This may help decrease the nerve inflammation for the first few days after surgery.  If some of your leg pain returns 3 to 5 days after surgery it may be due to the steroid wearing off.  The pain should not be as bad as your pre-op pain and will diminish over a period of weeks.      You should call Dr. Goldstein s office if your leg pain returns suddenly and does not improve over 24 hours.    Activity    You may increase your activity as tolerated; walking is the best form of exercise after spine surgery.      Avoid bending, lifting, and twisting (BLT).  Avoid activities such as vacuuming, raking and shoveling.    Try to limit your lifting to 10-15lbs during the first 2 weeks and then increase to 20-25lbs over the next 6 weeks.    You may return to work approximately 1- 2 weeks after your surgery, if you have a sedentary or desk type job.  If you have a physical job Dr. Goldstein and his staff will help you determine a return to work plan.      You may resume sexual activity when you feel ready.  Stop if you have pain.    Driving    You may drive if you feel strong enough and are not taking any narcotic pain medications.    Incision Site     The first 3 days after surgery Dr. Goldstein would like you to keep your incision dry.   You may take a sponge bath during this time or cover your dressing with a transparent material called Tegaderm (sold at most pharmacies).      The first 3 days after surgery you  should change your gauze dressing at least once a day.  After 3 days you may remove the gauze dressing and leave it off, at this point you may shower without covering your incision, allow water and soap to run over your incision but do not scrub the area or soak in a tub.    Your incision is covered with steri-strips (narrow white tapes); they will get loose and fall of in 7-10 days.  If they do not fall off after 10 days you may remove them or Dr. Triston mcdonough staff will remove them at your post-op visit.    Most patients have dissolvable stitches which do not need to be removed.  In some cases nylon stitches are used and they need to be removed, 10-14 days after surgery.  If you have staples or nylon sutures please call for a removal appointment with Dr. Triston mcdonough nurse.    Pain Management     Take your prescribed pain medication as needed and directed.  Use Tylenol and Ibuprofen for your discomfort when you no longer need the narcotic pain medication.      If you need a refill on your pain medication call [phone], please allow 24 hours for your prescription to be refilled, Dr. Triston mcdonough office does not refill pain medications on Friday.   Diet     Eat a healthy diet; this will help your recovery.    Drink plenty of fluids, water, milk or juice.    If you have trouble with constipation you should eat more fiber, drink more fluids, increase your walking or try an over the counter laxative.    Follow-up Visits    You should have a post-op appointment that was scheduled for you at the same time your surgery was scheduled. If you do not, please call Dr. Triston mcdonough office when you get home from your surgery.    Write down any questions you have about your surgery, recovery, return to work and other topics you wished to be covered at your post-op visit.  This way, we will be able to address all of your questions at your next visit.    Call Dr. Triston mcdonough office if you have any questions or concerns.    When to Call your  Doctor:    If you have any redness, warmth or swelling at the incision site.    If your incision opens up.    If you have increasing drainage from your incision.    If you have a temperature greater than 100.5 degrees Fahrenheit.    [address] [place] [address]  Phone: [phone]  Fax: [phone]  Web site: [email or web address]

## 2017-01-26 NOTE — PROGRESS NOTES
[id number]  1945 - Wed 25 Jan 2017   741   Discharge Med script written per Dr Goldstein's plan -   - oxycodone (5mg) - 1-2tab q3h prn pain - #30(thirty) - no refills   A Johana PENN / 0 min   [facility] / [phone] (p)

## 2017-01-26 NOTE — PLAN OF CARE
Problem: Goal Outcome Summary  Goal: Goal Outcome Summary  Outcome: Improving  Discharge instructions given to pt. Pt verbalized understanding of d/c instructions. Pt getting ride home with wife. Belongings sent home with patient. No further questions from pt.

## 2017-01-26 NOTE — PLAN OF CARE
Problem: Goal Outcome Summary  Goal: Goal Outcome Summary  Outcome: Improving  Pt to floor post op at 1515. Minimal pain at incision site, received IV dilaudid x1 and 5 mg of PO oxycodone with fair relief. Pt had been using oxycodone for pre op pain in LLE. Dressing CDI. Mild intermittent numbness in RLE, strength in LLE slightly decreased at 4/5 with no N/T. Positive BS, passing flatus, ordered regular diet tray for dinner. Ambulated in hallway with SBA. Voiding without difficulty. Pt may DC tonight per Dr. Goldstein's instructions if pt and wife in agreement with this plan.

## (undated) DEVICE — DRSG ADAPTIC 3X8" 6113

## (undated) DEVICE — ESU GROUND PAD UNIVERSAL W/O CORD

## (undated) DEVICE — DRAPE SHEET REV FOLD 3/4 9349

## (undated) DEVICE — GLOVE PROTEXIS POWDER FREE 6.5 ORTHOPEDIC 2D73ET65

## (undated) DEVICE — SU VICRYL 3-0 PS-1 18" UND J683

## (undated) DEVICE — NDL 19GA 1.5"

## (undated) DEVICE — DRSG STERI STRIP 1/2X4" R1547

## (undated) DEVICE — SPONGE SURGIFOAM 50

## (undated) DEVICE — SU VICRYL 1 MO-4 18" J702D

## (undated) DEVICE — DRSG GAUZE 4X4" 3033

## (undated) DEVICE — SYR 01ML 27GA 0.5" NDL TBC 309623

## (undated) DEVICE — PREP CHLORAPREP 26ML TINTED ORANGE  260815

## (undated) DEVICE — ESU CORD BIPOLAR 12' E0509

## (undated) DEVICE — CUSHION INSERT LG PRONE VIEW JACKSON TABLE

## (undated) DEVICE — PACK SPINE SM CUSTOM SNE15SSFSK

## (undated) DEVICE — GLOVE PROTEXIS BLUE W/NEU-THERA 6.5  2D73EB65

## (undated) DEVICE — SU VICRYL 2-0 CT-1 27" J339H

## (undated) DEVICE — DRAPE POUCH INSTRUMENT 1018

## (undated) DEVICE — TUBING SUCTION SOFT 20'X3/16" 0036570

## (undated) DEVICE — GLOVE PROTEXIS POWDER FREE 8.0 ORTHOPEDIC 2D73ET80

## (undated) DEVICE — DRSG ADAPTIC 3X3" 6112

## (undated) DEVICE — LINEN TOWEL PACK X5 5464

## (undated) DEVICE — SU VICRYL 0 CT-1 CR 8X18" J740D

## (undated) DEVICE — NDL 30GA 0.5" 305106

## (undated) DEVICE — SUCTION MANIFOLD NEPTUNE SGL

## (undated) DEVICE — GLOVE PROTEXIS BLUE W/NEU-THERA 7.5  2D73EB75

## (undated) RX ORDER — DIPHENHYDRAMINE HYDROCHLORIDE 50 MG/ML
INJECTION INTRAMUSCULAR; INTRAVENOUS
Status: DISPENSED
Start: 2017-01-25

## (undated) RX ORDER — CEFAZOLIN SODIUM 2 G/100ML
INJECTION, SOLUTION INTRAVENOUS
Status: DISPENSED
Start: 2017-01-25

## (undated) RX ORDER — FENTANYL CITRATE 50 UG/ML
INJECTION, SOLUTION INTRAMUSCULAR; INTRAVENOUS
Status: DISPENSED
Start: 2017-01-25

## (undated) RX ORDER — ESMOLOL HYDROCHLORIDE 10 MG/ML
INJECTION INTRAVENOUS
Status: DISPENSED
Start: 2017-01-25

## (undated) RX ORDER — PROPOFOL 10 MG/ML
INJECTION, EMULSION INTRAVENOUS
Status: DISPENSED
Start: 2017-01-25

## (undated) RX ORDER — ONDANSETRON 2 MG/ML
INJECTION INTRAMUSCULAR; INTRAVENOUS
Status: DISPENSED
Start: 2017-01-25

## (undated) RX ORDER — BUPIVACAINE HYDROCHLORIDE AND EPINEPHRINE 5; 5 MG/ML; UG/ML
INJECTION, SOLUTION EPIDURAL; INTRACAUDAL; PERINEURAL
Status: DISPENSED
Start: 2017-01-25

## (undated) RX ORDER — LIDOCAINE HYDROCHLORIDE 20 MG/ML
INJECTION, SOLUTION EPIDURAL; INFILTRATION; INTRACAUDAL; PERINEURAL
Status: DISPENSED
Start: 2017-01-25

## (undated) RX ORDER — BETAMETHASONE SODIUM PHOSPHATE AND BETAMETHASONE ACETATE 3; 3 MG/ML; MG/ML
INJECTION, SUSPENSION INTRA-ARTICULAR; INTRALESIONAL; INTRAMUSCULAR; SOFT TISSUE
Status: DISPENSED
Start: 2017-01-25

## (undated) RX ORDER — GLYCOPYRROLATE 0.2 MG/ML
INJECTION, SOLUTION INTRAMUSCULAR; INTRAVENOUS
Status: DISPENSED
Start: 2017-01-25

## (undated) RX ORDER — DEXAMETHASONE SODIUM PHOSPHATE 4 MG/ML
INJECTION, SOLUTION INTRA-ARTICULAR; INTRALESIONAL; INTRAMUSCULAR; INTRAVENOUS; SOFT TISSUE
Status: DISPENSED
Start: 2017-01-25

## (undated) RX ORDER — HYDROMORPHONE HYDROCHLORIDE 1 MG/ML
INJECTION, SOLUTION INTRAMUSCULAR; INTRAVENOUS; SUBCUTANEOUS
Status: DISPENSED
Start: 2017-01-25